# Patient Record
Sex: FEMALE | Race: WHITE | NOT HISPANIC OR LATINO | Employment: FULL TIME | URBAN - METROPOLITAN AREA
[De-identification: names, ages, dates, MRNs, and addresses within clinical notes are randomized per-mention and may not be internally consistent; named-entity substitution may affect disease eponyms.]

---

## 2023-08-01 ENCOUNTER — APPOINTMENT (OUTPATIENT)
Dept: GENERAL RADIOLOGY | Facility: CLINIC | Age: 43
End: 2023-08-01
Attending: EMERGENCY MEDICINE
Payer: OTHER MISCELLANEOUS

## 2023-08-01 ENCOUNTER — HOSPITAL ENCOUNTER (EMERGENCY)
Facility: CLINIC | Age: 43
Discharge: HOME | End: 2023-08-01
Attending: EMERGENCY MEDICINE | Admitting: EMERGENCY MEDICINE
Payer: OTHER MISCELLANEOUS

## 2023-08-01 VITALS
TEMPERATURE: 98 F | HEART RATE: 73 BPM | OXYGEN SATURATION: 100 % | WEIGHT: 110 LBS | BODY MASS INDEX: 20.77 KG/M2 | RESPIRATION RATE: 16 BRPM | HEIGHT: 61 IN | SYSTOLIC BLOOD PRESSURE: 113 MMHG | DIASTOLIC BLOOD PRESSURE: 66 MMHG

## 2023-08-01 DIAGNOSIS — S49.92XA SHOULDER INJURY, LEFT, INITIAL ENCOUNTER: ICD-10-CM

## 2023-08-01 PROCEDURE — 250N000013 HC RX MED GY IP 250 OP 250 PS 637: Performed by: EMERGENCY MEDICINE

## 2023-08-01 PROCEDURE — 73030 X-RAY EXAM OF SHOULDER: CPT | Mod: LT

## 2023-08-01 PROCEDURE — 99284 EMERGENCY DEPT VISIT MOD MDM: CPT | Performed by: EMERGENCY MEDICINE

## 2023-08-01 PROCEDURE — 99283 EMERGENCY DEPT VISIT LOW MDM: CPT

## 2023-08-01 RX ORDER — IBUPROFEN 400 MG/1
800 TABLET, FILM COATED ORAL ONCE
Status: COMPLETED | OUTPATIENT
Start: 2023-08-01 | End: 2023-08-01

## 2023-08-01 RX ORDER — ACETAMINOPHEN 500 MG
1000 TABLET ORAL ONCE
Status: COMPLETED | OUTPATIENT
Start: 2023-08-01 | End: 2023-08-01

## 2023-08-01 RX ADMIN — ACETAMINOPHEN 1000 MG: 500 TABLET, FILM COATED ORAL at 09:40

## 2023-08-01 RX ADMIN — IBUPROFEN 800 MG: 400 TABLET ORAL at 09:40

## 2023-08-01 ASSESSMENT — ACTIVITIES OF DAILY LIVING (ADL): ADLS_ACUITY_SCORE: 33

## 2023-08-01 NOTE — ED PROVIDER NOTES
"  History     Chief Complaint   Patient presents with    Shoulder Pain     HPI  Emma Nunez is a 43 year old female who left shoulder pain began yesterday while standing at work, she was using a ajay above her head when she brought her arm down she felt a pulling sensation in the left shoulder progressively worse sharp pain got to the point that she was nauseated by the time she got home from work.  She took some ibuprofen last night and early this morning.  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  She is right-handed.  No current prescription meds.    Allergies:  Allergies   Allergen Reactions    Amoxicillin Anaphylaxis    Clarithromycin Rash    Fluoxetine Hcl Nausea    Latex Rash     Sensitivity- skin turns red when touched with latex    Metronidazole Hcl Anaphylaxis and Hives     Was seen in ED    Penicillins Anaphylaxis       Problem List:    There are no problems to display for this patient.       Past Medical History:    No past medical history on file.    Past Surgical History:    No past surgical history on file.    Family History:    Family History   Problem Relation Age of Onset    Asthma Father        Social History:  Marital Status:  Single [1]  Social History     Tobacco Use    Smoking status: Every Day     Packs/day: 0.50     Years: 15.00     Pack years: 7.50     Types: Cigarettes   Substance Use Topics    Alcohol use: Yes     Comment: rare    Drug use: No        Medications:    MIDRIN 325- MG OR CAPS          Review of Systems    Physical Exam   BP: 113/66  Pulse: 73  Temp: 98  F (36.7  C)  Resp: 16  Height: 154.9 cm (5' 1\")  Weight: 49.9 kg (110 lb)  SpO2: 100 %      Physical Exam  Nontoxic-appearing no respiratory distress alert and oriented  Skin Pink warm dry  Examination left shoulder shows tenderness subacromial, deltoid sensation intact, " clavicle and AC joint are nontender, holding arm abducted and internally rotated, radial pulse strong, strength and sensation intact distally  ED Course                 Procedures                No results found for this or any previous visit (from the past 24 hour(s)).    Medications   acetaminophen (TYLENOL) tablet 1,000 mg (1,000 mg Oral $Given 8/1/23 0940)   ibuprofen (ADVIL/MOTRIN) tablet 800 mg (800 mg Oral $Given 8/1/23 0940)       Assessments & Plan (with Medical Decision Making)  Left shoulder pain usual differential details per HPI x-ray images reviewed independently negative for acute finding.  Ibuprofen/acetaminophen, ice, range of motion, follow-up outpatient, work restriction.  Findings consistent with impingement.     I have reviewed the nursing notes.    I have reviewed the findings, diagnosis, plan and need for follow up with the patient.          New Prescriptions    No medications on file       Final diagnoses:   Shoulder injury, left, initial encounter - Findings consistent with impingement       8/1/2023   [facility] EMERGENCY DEPT       Jerzy Kelly MD  08/01/23 [id number]

## 2023-08-01 NOTE — LETTER
August 1, 2023      To Whom It May Concern:      Emma Nunez was seen in our Emergency Department today, 08/01/23.  I expect her condition to improve over the next 7-10 days.  No use left upper extremity for the next 1 week    Sincerely,        Jerzy Kelly MD, MD

## 2023-08-01 NOTE — ED TRIAGE NOTES
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 States it feels like she pulled a muscle and reports severe pain when lifting shoulder away from body. Occupational health will be arriving to the ED within the hour.      Triage Assessment       Row Name 08/01/23 0830       Triage Assessment (Adult)    Airway WDL WDL       Respiratory WDL    Respiratory WDL WDL       Skin Circulation/Temperature WDL    Skin Circulation/Temperature WDL WDL       Cardiac WDL    Cardiac WDL WDL       Peripheral/Neurovascular WDL    Peripheral Neurovascular WDL WDL       Cognitive/Neuro/Behavioral WDL    Cognitive/Neuro/Behavioral WDL WDL

## 2023-08-01 NOTE — DISCHARGE INSTRUCTIONS
Acetaminophen/ibuprofen    Ice    Range of motion as tolerated    No use at work for the next 1 week    Follow-up sports medicine for further evaluation